# Patient Record
Sex: FEMALE | Race: OTHER | HISPANIC OR LATINO | Employment: PART TIME | URBAN - NONMETROPOLITAN AREA
[De-identification: names, ages, dates, MRNs, and addresses within clinical notes are randomized per-mention and may not be internally consistent; named-entity substitution may affect disease eponyms.]

---

## 2021-09-20 ENCOUNTER — HOSPITAL ENCOUNTER (EMERGENCY)
Facility: HOSPITAL | Age: 36
Discharge: HOME | End: 2021-09-20
Attending: EMERGENCY MEDICINE | Admitting: EMERGENCY MEDICINE
Payer: COMMERCIAL

## 2021-09-20 VITALS
DIASTOLIC BLOOD PRESSURE: 88 MMHG | OXYGEN SATURATION: 97 % | RESPIRATION RATE: 18 BRPM | TEMPERATURE: 99.4 F | HEIGHT: 65 IN | HEART RATE: 78 BPM | WEIGHT: 250 LBS | BODY MASS INDEX: 41.65 KG/M2 | SYSTOLIC BLOOD PRESSURE: 152 MMHG

## 2021-09-20 DIAGNOSIS — J02.9 SORE THROAT: ICD-10-CM

## 2021-09-20 DIAGNOSIS — R51.9 HEADACHE: Primary | ICD-10-CM

## 2021-09-20 LAB — SARS-COV-2 RNA RESP QL NAA+PROBE: NEGATIVE

## 2021-09-20 PROCEDURE — 96372 THER/PROPH/DIAG INJ SC/IM: CPT

## 2021-09-20 PROCEDURE — U0005 INFEC AGEN DETEC AMPLI PROBE: HCPCS | Performed by: EMERGENCY MEDICINE

## 2021-09-20 PROCEDURE — 99284 EMERGENCY DEPT VISIT MOD MDM: CPT | Performed by: EMERGENCY MEDICINE

## 2021-09-20 PROCEDURE — U0003 INFECTIOUS AGENT DETECTION BY NUCLEIC ACID (DNA OR RNA); SEVERE ACUTE RESPIRATORY SYNDROME CORONAVIRUS 2 (SARS-COV-2) (CORONAVIRUS DISEASE [COVID-19]), AMPLIFIED PROBE TECHNIQUE, MAKING USE OF HIGH THROUGHPUT TECHNOLOGIES AS DESCRIBED BY CMS-2020-01-R: HCPCS | Performed by: EMERGENCY MEDICINE

## 2021-09-20 PROCEDURE — 99283 EMERGENCY DEPT VISIT LOW MDM: CPT

## 2021-09-20 RX ORDER — ACETAMINOPHEN 325 MG/1
975 TABLET ORAL ONCE
Status: COMPLETED | OUTPATIENT
Start: 2021-09-20 | End: 2021-09-20

## 2021-09-20 RX ORDER — KETOROLAC TROMETHAMINE 30 MG/ML
15 INJECTION, SOLUTION INTRAMUSCULAR; INTRAVENOUS ONCE
Status: COMPLETED | OUTPATIENT
Start: 2021-09-20 | End: 2021-09-20

## 2021-09-20 RX ADMIN — KETOROLAC TROMETHAMINE 15 MG: 30 INJECTION, SOLUTION INTRAMUSCULAR; INTRAVENOUS at 22:14

## 2021-09-20 RX ADMIN — ACETAMINOPHEN 975 MG: 325 TABLET, FILM COATED ORAL at 22:14

## 2021-09-20 NOTE — Clinical Note
Eleanor Olmos was seen and treated in our emergency department on 9/20/2021  Other - See Comments        Diagnosis: headache, sore throat    Solange Sharpe  may return to work on return date  She may return on this date: 09/21/2021         If you have any questions or concerns, please don't hesitate to call        Michael Gross, DO    ______________________________           _______________          _______________  Hospital Representative                              Date                                Time

## 2021-09-21 NOTE — ED PROVIDER NOTES
History  Chief Complaint   Patient presents with    Headache     pt reports headache and sore throat for a few days now  Patient is a 59-year-old female with no significant medical history presenting for evaluation of a headache and sore throat  Patient says the symptoms have been present for the past 4 days  She says that the headache was gradual in onset and has been persistent  She says it is located at the top of her head  She denies associated nausea, vomiting, visual changes, neck pain  She says that she has felt warm and says that she had a temperature of 100 yesterday  She has last took ibuprofen this morning  She has been able to tolerate p o  She denies any cough, shortness of breath, chest pain  Patient says that 2 of her co-workers did recently test positive for COVID  She was vaccinated against COVID-19 with her last vaccine being in June  None       No past medical history on file  No past surgical history on file  No family history on file  I have reviewed and agree with the history as documented  E-Cigarette/Vaping     E-Cigarette/Vaping Substances     Social History     Tobacco Use    Smoking status: Never Smoker    Smokeless tobacco: Never Used   Substance Use Topics    Alcohol use: Never    Drug use: Not Currently       Review of Systems   Constitutional: Negative for fever and unexpected weight change  HENT: Positive for sore throat  Negative for congestion, ear pain and trouble swallowing  Eyes: Negative for pain and redness  Respiratory: Negative for cough, chest tightness and shortness of breath  Cardiovascular: Negative for chest pain and leg swelling  Gastrointestinal: Negative for abdominal distention, abdominal pain, diarrhea and vomiting  Endocrine: Negative for polyuria  Genitourinary: Negative for dysuria, hematuria, pelvic pain and vaginal bleeding  Musculoskeletal: Negative for back pain and myalgias     Skin: Negative for rash    Neurological: Positive for headaches  Negative for dizziness, syncope, weakness and light-headedness  Physical Exam  Physical Exam  Vitals and nursing note reviewed  Constitutional:       General: She is not in acute distress  Appearance: She is well-developed  HENT:      Head: Normocephalic and atraumatic  Right Ear: External ear normal       Left Ear: External ear normal       Nose: Nose normal       Mouth/Throat:      Pharynx: No oropharyngeal exudate  Eyes:      Conjunctiva/sclera: Conjunctivae normal       Pupils: Pupils are equal, round, and reactive to light  Cardiovascular:      Rate and Rhythm: Normal rate and regular rhythm  Heart sounds: Normal heart sounds  No murmur heard  No friction rub  No gallop  Pulmonary:      Effort: Pulmonary effort is normal  No respiratory distress  Breath sounds: Normal breath sounds  No wheezing or rales  Abdominal:      General: There is no distension  Palpations: Abdomen is soft  Tenderness: There is no abdominal tenderness  There is no guarding  Musculoskeletal:         General: No swelling, tenderness or deformity  Normal range of motion  Cervical back: Normal range of motion and neck supple  Lymphadenopathy:      Cervical: No cervical adenopathy  Skin:     General: Skin is warm and dry  Neurological:      Mental Status: She is alert and oriented to person, place, and time  Cranial Nerves: No cranial nerve deficit  Sensory: No sensory deficit  Motor: No abnormal muscle tone           Vital Signs  ED Triage Vitals   Temperature Pulse Respirations Blood Pressure SpO2   09/20/21 1945 09/20/21 1945 09/20/21 1945 09/20/21 1945 09/20/21 1945   99 4 °F (37 4 °C) 86 18 169/95 100 %      Temp Source Heart Rate Source Patient Position - Orthostatic VS BP Location FiO2 (%)   09/20/21 1945 -- 09/20/21 1945 09/20/21 1945 --   Tympanic  Sitting Right arm       Pain Score       09/20/21 2214       7 Vitals:    09/20/21 1945 09/20/21 2200   BP: 169/95 152/88   Pulse: 86 78   Patient Position - Orthostatic VS: Sitting          Visual Acuity      ED Medications  Medications   ketorolac (TORADOL) injection 15 mg (15 mg Intramuscular Given 9/20/21 2214)   acetaminophen (TYLENOL) tablet 975 mg (975 mg Oral Given 9/20/21 2214)       Diagnostic Studies  Results Reviewed     Procedure Component Value Units Date/Time    Novel Coronavirus (Covid-19),PCR [facility] - 2 Hour Stat [[id number]]  (Normal) Collected: 09/20/21 2213    Lab Status: Final result Specimen: Nares from Nose Updated: 09/20/21 2315     SARS-CoV-2 Negative    Narrative: The specimen collection materials, transport medium, and/or testing methodology utilized in the production of these test results have been proven to be reliable in a limited validation with an abbreviated program under the Emergency Utilization Authorization provided by the FDA  Testing reported as "Presumptive positive" will be confirmed with secondary testing to ensure result accuracy  Clinical caution and judgement should be used with the interpretation of these results with consideration of the clinical impression and other laboratory testing  Testing reported as "Positive" or "Negative" has been proven to be accurate according to standard laboratory validation requirements  All testing is performed with control materials showing appropriate reactivity at standard intervals  No orders to display              Procedures  Procedures         ED Course                                           MDM  Number of Diagnoses or Management Options  Diagnosis management comments: 78-year-old female presenting for 4 days of generalized headache, sore throat  Has been able to tolerate p o  No nausea, vomiting, visual changes  Headache gradual in nature  No cough, shortness of breath  Was vaccinated against [id number]-87 but has been around COVID positive coworkers  Vitals within normal limits, patient no acute distress, no tonsillar swelling or exudates on exam   Will screen for COVID  Will give Tylenol and Toradol for headache  Disposition  Final diagnoses:   Headache   Sore throat     Time reflects when diagnosis was documented in both MDM as applicable and the Disposition within this note     Time User Action Codes Description Comment    9/20/2021 11:15 PM Bren Fernandez [R51 9] Headache     9/20/2021 11:15 PM Angela Valdez [J02 9] Sore throat       ED Disposition     ED Disposition Condition Date/Time Comment    Discharge Stable Mon Sep 20, 2021 11:15 PM Stephenie Garcia discharge to home/self care  Follow-up Information     Follow up With Specialties Details Why [address] [facility]  Family Medicine Schedule an appointment as soon as possible for a visit  For follow up [place] [address] [phone]            There are no discharge medications for this patient  No discharge procedures on file      PDMP Review     None          ED Provider  Electronically Signed by           Marcy Kim DO  09/21/21 9972

## 2023-02-03 ENCOUNTER — HOSPITAL ENCOUNTER (EMERGENCY)
Facility: HOSPITAL | Age: 38
Discharge: HOME | End: 2023-02-04
Attending: EMERGENCY MEDICINE

## 2023-02-03 ENCOUNTER — APPOINTMENT (EMERGENCY)
Dept: RADIOLOGY | Facility: HOSPITAL | Age: 38
End: 2023-02-03

## 2023-02-03 VITALS
SYSTOLIC BLOOD PRESSURE: 140 MMHG | DIASTOLIC BLOOD PRESSURE: 84 MMHG | TEMPERATURE: 98 F | RESPIRATION RATE: 18 BRPM | HEART RATE: 87 BPM | OXYGEN SATURATION: 98 %

## 2023-02-03 DIAGNOSIS — M25.561 ACUTE PAIN OF RIGHT KNEE: Primary | ICD-10-CM

## 2023-02-03 RX ORDER — DICYCLOMINE HCL 20 MG
TABLET ORAL
COMMUNITY
Start: 2023-01-17

## 2023-02-04 RX ORDER — IBUPROFEN 600 MG/1
600 TABLET ORAL EVERY 6 HOURS PRN
Qty: 30 TABLET | Refills: 0 | Status: SHIPPED | OUTPATIENT
Start: 2023-02-04

## 2023-02-04 RX ORDER — SENNOSIDES 8.6 MG
650 CAPSULE ORAL EVERY 8 HOURS PRN
Qty: 30 TABLET | Refills: 0 | Status: SHIPPED | OUTPATIENT
Start: 2023-02-04

## 2023-02-04 NOTE — DISCHARGE INSTRUCTIONS
You have been seen for right knee pain  Please take tylenol and motrin as needed for pain as discussed  Return to the emergency department if you develop worsening pain, swelling, fevers, weakness/numbness or any other symptoms of concern  Please follow up with orthopedics by calling the number provided

## 2023-02-04 NOTE — ED PROVIDER NOTES
History  Chief Complaint   Patient presents with   • Knee Pain     Right knee pain  Unable to bear weight  Started 3 days ago     Rehana Moise is a 40y o  year old female with PMH of IBS presenting to the [place] [address] ED for right knee pain  Patient has had three days of constant pain  No reported falls/trauma to the area  Patient reporting 6/10 throbbing sensation  Pain is worse when bearing weight on the right leg  No pain in the right calf  Contrary to triage patient states she can bear weight on the right leg though this worsens her pain  No reported fevers/chills or swelling/redness in the right knee  Patient denies associated weakness/numbness in the RLE  The patient has not taken/received any medications at home for relief of symptoms  No history of diabetes or immunosuppression  Only daily medication is PRN bentyl  History provided by:  Patient and medical records   used: No    Knee Pain  Associated symptoms: back pain (chronic)    Associated symptoms: no fever        Prior to Admission Medications   Prescriptions Last Dose Informant Patient Reported? Taking?   dicyclomine (BENTYL) 20 mg tablet   Yes Yes   Sig: take 1 tablet by mouth every 6 hours for abdominal pain      Facility-Administered Medications: None       History reviewed  No pertinent past medical history  History reviewed  No pertinent surgical history  History reviewed  No pertinent family history  I have reviewed and agree with the history as documented  E-Cigarette/Vaping     E-Cigarette/Vaping Substances     Social History     Tobacco Use   • Smoking status: Never   • Smokeless tobacco: Never   Substance Use Topics   • Alcohol use: Never   • Drug use: Not Currently       Review of Systems   Constitutional: Negative for chills and fever  Respiratory: Negative for shortness of breath  Cardiovascular: Negative for chest pain  Gastrointestinal: Negative for abdominal pain     Musculoskeletal: Positive for arthralgias and back pain (chronic)  Negative for joint swelling and myalgias  Skin: Negative for color change and rash  Neurological: Negative for weakness and numbness  All other systems reviewed and are negative  Physical Exam  Physical Exam  Vitals and nursing note reviewed  Constitutional:       General: She is not in acute distress  Appearance: Normal appearance  She is well-developed  She is obese  She is not ill-appearing, toxic-appearing or diaphoretic  HENT:      Head: Normocephalic and atraumatic  Nose: No congestion or rhinorrhea  Eyes:      General:         Right eye: No discharge  Left eye: No discharge  Cardiovascular:      Rate and Rhythm: Normal rate and regular rhythm  Pulmonary:      Effort: Pulmonary effort is normal  No accessory muscle usage or respiratory distress  Breath sounds: Normal breath sounds  No stridor  No decreased breath sounds, wheezing, rhonchi or rales  Abdominal:      General: There is no distension  Palpations: Abdomen is soft  Tenderness: There is no abdominal tenderness  There is no guarding or rebound  Musculoskeletal:      Cervical back: Normal range of motion  No rigidity  Right hip: No tenderness  Normal range of motion  Normal strength  Right upper leg: No swelling, edema or tenderness  Right knee: Bony tenderness (infrapatellar and tibial tuberosity region) present  No swelling, effusion, erythema, ecchymosis, lacerations or crepitus  Normal range of motion  Tenderness present  No medial joint line or lateral joint line tenderness  Right lower leg: No tenderness  No edema  Left lower leg: No tenderness  No edema  Right ankle: No swelling  No tenderness  Normal range of motion  Right foot: No swelling or tenderness  Skin:     Capillary Refill: Capillary refill takes less than 2 seconds  Findings: No erythema or rash     Neurological:      Mental Status: She is alert and oriented to person, place, and time  Psychiatric:         Mood and Affect: Mood normal          Behavior: Behavior normal          Vital Signs  ED Triage Vitals [02/03/23 2304]   Temperature Pulse Respirations Blood Pressure SpO2   98 °F (36 7 °C) 87 18 140/84 98 %      Temp src Heart Rate Source Patient Position - Orthostatic VS BP Location FiO2 (%)   -- Monitor Lying Right arm --      Pain Score       6           Vitals:    02/03/23 2304   BP: 140/84   Pulse: 87   Patient Position - Orthostatic VS: Lying         Visual Acuity      ED Medications  Medications - No data to display    Diagnostic Studies  Results Reviewed     None                 XR knee 4+ vw right injury   ED Interpretation by Arpita Bowling DO (02/03 2342)   Mild effusion  Posterior knee osseous structure  No acute fracture or dislocation  Procedures  Procedures         ED Course                                             Medical Decision Making    40 y o  female presenting for atraumatic right knee pain  VSS, nontoxic appearing  RLE NVI  The patient does not report fevers/chills and there is no effusion/erythema  micromotion tenderness in the right knee, therefore I do not suspect septic arthritis  The patient does not demonstrate right calf tenderness, therefore I do not suspect DVT  DDx includes arthritis vs bursitis vs soft tissue injury of the knee  Will order xray to evaluate for pathologic fracture or arthritis  Patient declines analgesia in ED  I have reviewed preliminary ED interpretation of x-rays with the patient  The patient understands that their x-rays will be interpreted independently by a radiologist at a later time  The patient is agreeable to discharge at this time and understands that they may be called back to the emergency department pending formal xray interpretation by radiology       Disposition: I have discussed with the patient our plan to discharge them from the ED and the patient is in agreement with this plan  Discharge Plan: RICE therapy, PRN tylenol/motrin, crutches to facilitate right knee rest  RTED precautions emphasized  The patient was provided verbal RTED precautions and a written after visit summary with strict RTED precautions  Followup: I have discussed with the patient plan to follow up with Orthopedics  Contact information provided in AVS     Acute pain of right knee: acute illness or injury  Amount and/or Complexity of Data Reviewed  Radiology: ordered and independent interpretation performed  Risk  OTC drugs  Prescription drug management  Disposition  Final diagnoses:   Acute pain of right knee     Time reflects when diagnosis was documented in both MDM as applicable and the Disposition within this note     Time User Action Codes Description Comment    2/3/2023 11:48 PM Kristopher Harmon Add [[id number] [id number]] Acute pain of right knee       ED Disposition     ED Disposition   Discharge    Condition   Stable    Date/Time   Sat Feb 4, 2023 12:01 AM    Comment   Stephenie Garcia discharge to home/self care  Follow-up Information     Follow up With Specialties Details Why Contact Info Additional [address] [facility]ists [facility] Orthopedic Surgery Schedule an appointment as soon as possible for a visit  To make appointment for reevaluation in 3-5 days   [address] [facility] [id number]  [address] [place] [address] Specialists [facility] [address] [facility], [place], [place], [address]          Patient's Medications   Discharge Prescriptions    ACETAMINOPHEN (TYLENOL) 650 MG CR TABLET    Take 1 tablet (650 mg total) by mouth every 8 (eight) hours as needed for moderate pain       Start Date: 2/4/2023  End Date: --       Order Dose: 650 mg       Quantity: 30 tablet    Refills: 0    IBUPROFEN (MOTRIN) 600 MG TABLET    Take 1 tablet (600 mg total) by mouth every 6 (six) hours as needed for moderate pain or mild pain       Start Date: 2/4/2023  End Date: --       Order Dose: 600 mg       Quantity: 30 tablet    Refills: 0       No discharge procedures on file      PDMP Review     None          ED Provider  Electronically Signed by           Elan Harmon DO  02/04/23 0013

## 2023-03-09 ENCOUNTER — HOSPITAL ENCOUNTER (EMERGENCY)
Facility: HOSPITAL | Age: 38
Discharge: HOME | End: 2023-03-10
Attending: EMERGENCY MEDICINE

## 2023-03-09 DIAGNOSIS — N89.8 VAGINAL DISCHARGE: ICD-10-CM

## 2023-03-09 DIAGNOSIS — R10.2 SUPRAPUBIC DISCOMFORT: Primary | ICD-10-CM

## 2023-03-10 VITALS
SYSTOLIC BLOOD PRESSURE: 166 MMHG | DIASTOLIC BLOOD PRESSURE: 81 MMHG | TEMPERATURE: 97.6 F | OXYGEN SATURATION: 98 % | RESPIRATION RATE: 18 BRPM | HEART RATE: 78 BPM

## 2023-03-10 LAB
BILIRUB UR QL STRIP: NEGATIVE
C TRACH DNA SPEC QL NAA+PROBE: NEGATIVE
CLARITY UR: CLEAR
COLOR UR: YELLOW
EXT PREGNANCY TEST URINE: NEGATIVE
EXT. CONTROL: NORMAL
GLUCOSE UR STRIP-MCNC: NEGATIVE MG/DL
HGB UR QL STRIP.AUTO: NEGATIVE
KETONES UR STRIP-MCNC: NEGATIVE MG/DL
LEUKOCYTE ESTERASE UR QL STRIP: NEGATIVE
N GONORRHOEA DNA SPEC QL NAA+PROBE: NEGATIVE
NITRITE UR QL STRIP: NEGATIVE
PH UR STRIP.AUTO: 6 [PH]
PROT UR STRIP-MCNC: NEGATIVE MG/DL
SP GR UR STRIP.AUTO: >=1.03 (ref 1–1.03)
UROBILINOGEN UR QL STRIP.AUTO: 0.2 E.U./DL

## 2023-03-10 RX ORDER — IBUPROFEN 600 MG/1
600 TABLET ORAL ONCE
Status: COMPLETED | OUTPATIENT
Start: 2023-03-10 | End: 2023-03-10

## 2023-03-10 RX ADMIN — IBUPROFEN 600 MG: 600 TABLET, FILM COATED ORAL at 00:17

## 2023-03-10 NOTE — ED PROVIDER NOTES
History  Chief Complaint   Patient presents with   • Painful Urination     Pt treated for UTI outpt 5 days of macrobrid  Still having urinary pain bladder pressure/cramping     Bartolo Andrade is a 40y o  year old female with PMH of IBS, hepatic steatosis presenting to the [facility] ED for suprapubic pain and urethral discomfort  Patient has had one week of suprapubic abdominal pain  Patient was diagnosed with a urinary tract infection and started on course of Macrobid  Patient initially had resolution of symptoms though today reports return of urinary urgency and suprapubic discomfort  Suprapubic pain is intermittent, rated as 6/10 at worst and intermittently radiating to bilateral lower quadrants of the abdomen  Patient reports frequency though denies dysuria/hematuria  No flank pain  Denies fevers, nausea/vomiting or any other abdominal pain  Patient states she is due for her period in 1 week  She reports a thin, white vaginal discharge and of no vaginal bleeding  She is in a monogamous relationship, she has no concerns for STD  The patient has not taken/received any medications at home for relief of symptoms  History provided by:  Patient and medical records   used: No        Prior to Admission Medications   Prescriptions Last Dose Informant Patient Reported? Taking?   acetaminophen (TYLENOL) 650 mg CR tablet   No No   Sig: Take 1 tablet (650 mg total) by mouth every 8 (eight) hours as needed for moderate pain   dicyclomine (BENTYL) 20 mg tablet   Yes No   Sig: take 1 tablet by mouth every 6 hours for abdominal pain   ibuprofen (MOTRIN) 600 mg tablet   No No   Sig: Take 1 tablet (600 mg total) by mouth every 6 (six) hours as needed for moderate pain or mild pain      Facility-Administered Medications: None       Past Medical History:   Diagnosis Date   • IBS (irritable bowel syndrome)        History reviewed  No pertinent surgical history  History reviewed   No pertinent family history  I have reviewed and agree with the history as documented  E-Cigarette/Vaping     E-Cigarette/Vaping Substances     Social History     Tobacco Use   • Smoking status: Never   • Smokeless tobacco: Never   Substance Use Topics   • Alcohol use: Never   • Drug use: Not Currently       Review of Systems   Constitutional: Negative for chills and fever  Respiratory: Negative for shortness of breath  Cardiovascular: Negative for chest pain  Gastrointestinal: Positive for abdominal pain  Negative for diarrhea, nausea and vomiting  Genitourinary: Positive for frequency, urgency and vaginal discharge  Negative for dysuria, flank pain and vaginal bleeding  Musculoskeletal: Negative for arthralgias  All other systems reviewed and are negative  Physical Exam  Physical Exam  Vitals and nursing note reviewed  Constitutional:       General: She is not in acute distress  Appearance: Normal appearance  She is well-developed  She is not ill-appearing, toxic-appearing or diaphoretic  HENT:      Head: Normocephalic and atraumatic  Nose: No congestion or rhinorrhea  Eyes:      General:         Right eye: No discharge  Left eye: No discharge  Cardiovascular:      Rate and Rhythm: Normal rate and regular rhythm  Pulmonary:      Effort: Pulmonary effort is normal  No accessory muscle usage or respiratory distress  Breath sounds: Normal breath sounds  No stridor  No decreased breath sounds, wheezing, rhonchi or rales  Abdominal:      General: There is no distension  Palpations: Abdomen is soft  Tenderness: There is abdominal tenderness in the suprapubic area  There is no right CVA tenderness, left CVA tenderness, guarding or rebound  Musculoskeletal:      Cervical back: Normal range of motion  No rigidity  Right lower leg: No tenderness  Left lower leg: No tenderness  Skin:     Capillary Refill: Capillary refill takes less than 2 seconds  Neurological:      Mental Status: She is alert and oriented to person, place, and time  Psychiatric:         Mood and Affect: Mood normal          Behavior: Behavior normal          Vital Signs  ED Triage Vitals [03/09/23 2345]   Temperature Pulse Respirations Blood Pressure SpO2   97 6 °F (36 4 °C) 89 17 162/95 100 %      Temp src Heart Rate Source Patient Position - Orthostatic VS BP Location FiO2 (%)   -- Monitor Lying Left arm --      Pain Score       --           Vitals:    03/09/23 2345 03/10/23 0030 03/10/23 0128   BP: 162/95 166/92 166/81   Pulse: 89 75 78   Patient Position - Orthostatic VS: Lying Sitting Sitting         Visual Acuity      ED Medications  Medications   ibuprofen (MOTRIN) tablet 600 mg (600 mg Oral Given 3/10/23 0017)       Diagnostic Studies  Results Reviewed     Procedure Component Value Units Date/Time    Urine culture [[id number]]     Lab Status: No result Specimen: Urine, Clean Catch     UA w Reflex to Microscopic w Reflex to Culture [[id number]] Collected: 03/10/23 0011    Lab Status: Final result Specimen: Urine, Clean Catch Updated: 03/10/23 0031     Color, UA Yellow     Clarity, UA Clear     Specific Gravity, UA >=1 030     pH, UA 6 0     Leukocytes, UA Negative     Nitrite, UA Negative     Protein, UA Negative mg/dl      Glucose, UA Negative mg/dl      Ketones, UA Negative mg/dl      Urobilinogen, UA 0 2 E U /dl      Bilirubin, UA Negative     Occult Blood, UA Negative    Chlamydia/GC amplified DNA by PCR [[id number]] Collected: 03/10/23 0011    Lab Status: In process Updated: 03/10/23 0023    POCT pregnancy, urine [[id number]]  (Normal) Resulted: 03/10/23 0017    Lab Status: Final result Updated: 03/10/23 0017     EXT Preg Test, Ur Negative     Control Valid                 No orders to display              Procedures  Procedures         ED Course                                             Medical Decision Making    40 y o  female presenting for suprapubic pain    Afebrile, nontoxic appearing  Minimal suprapubic pain on exam   I considered and do not suspect appendicitis, diverticulitis, aortic aneurysm, retroperitoneal hematoma/hemorrhage, intraabdominal perforation/abscess, bladder rupture, kidney stone or ovarian torsion  We will order repeat UA to exclude UTI refractory to previous course of Macrobid  Lower suspicion for STD though will add urine G/C  Differential includes interstitial cystitis or perimenstrual cramping  Reassessment: Patient remains well-appearing  Reviewed UA results  As patient without abnormal UA and no concern for STD on history, will observe off antibiotics and await urine culture results  Patient agreeable to defer labs or imaging at this time  Disposition: I have discussed with the patient our plan to discharge them from the ED and the patient is in agreement with this plan  Discharge Plan: Parent Tylenol/Motrin over-the-counter  PCP f/u  RTED precautions emphasized  The patient was provided a written after visit summary with strict RTED precautions  Followup: I have discussed with the patient plan to follow up with their PCP  Contact information provided in AVS     Amount and/or Complexity of Data Reviewed  Labs: ordered  Risk  Prescription drug management  Disposition  Final diagnoses:   Suprapubic discomfort   Vaginal discharge     Time reflects when diagnosis was documented in both MDM as applicable and the Disposition within this note     Time User Action Codes Description Comment    3/10/2023  1:21 AM Kannan Done Add [R10 2] Suprapubic discomfort     3/10/2023  1:21 AM Kannan Done Add [N89 8] Vaginal discharge       ED Disposition     ED Disposition   Discharge    Condition   Stable    Date/Time   Fri Mar 10, 2023 12:56 AM    Comment   Stephenie Garcia discharge to home/self care                 Follow-up Information     Follow up With Specialties Details Why [address] [facility], DO Family Medicine Schedule an appointment as soon as possible for a visit  To make appointment for reevaluation in 3-5 days  [address]  [phone]            Discharge Medication List as of 3/10/2023  1:22 AM      CONTINUE these medications which have NOT CHANGED    Details   acetaminophen (TYLENOL) 650 mg CR tablet Take 1 tablet (650 mg total) by mouth every 8 (eight) hours as needed for moderate pain, Starting Sat 2/4/2023, Normal      dicyclomine (BENTYL) 20 mg tablet take 1 tablet by mouth every 6 hours for abdominal pain, Historical Med      ibuprofen (MOTRIN) 600 mg tablet Take 1 tablet (600 mg total) by mouth every 6 (six) hours as needed for moderate pain or mild pain, Starting Sat 2/4/2023, Normal             No discharge procedures on file      PDMP Review     None          ED Provider  Electronically Signed by           Karissa Paz DO  03/10/23 0148

## 2023-03-10 NOTE — DISCHARGE INSTRUCTIONS
You have been seen for suprapubic discomfort  Please take tylenol and motrin as needed for your symptoms  Return to the emergency department if you develop worsening pain, vomiting, fevers or any other symptoms of concern  Please follow up with your PCP by calling the number provided  Your blood pressure is elevated on your visit today  When left untreated, the damage that high blood pressure does to your circulatory system is a significant contributing factor to heart attack, stroke, chronic kidney disease and other health threats  Please arrange for a blood pressure recheck with a PCP within the next week for further evaluation

## 2023-03-11 LAB — BACTERIA UR CULT: NORMAL

## 2023-03-31 ENCOUNTER — HOSPITAL ENCOUNTER (EMERGENCY)
Facility: HOSPITAL | Age: 38
Discharge: HOME | End: 2023-04-01
Attending: EMERGENCY MEDICINE

## 2023-03-31 VITALS
HEART RATE: 62 BPM | TEMPERATURE: 98.1 F | DIASTOLIC BLOOD PRESSURE: 95 MMHG | RESPIRATION RATE: 18 BRPM | OXYGEN SATURATION: 99 % | SYSTOLIC BLOOD PRESSURE: 179 MMHG

## 2023-03-31 DIAGNOSIS — N76.0 VAGINITIS: Primary | ICD-10-CM

## 2023-04-01 LAB
BACTERIA UR QL AUTO: ABNORMAL /HPF
BILIRUB UR QL STRIP: NEGATIVE
CLARITY UR: CLEAR
COLOR UR: YELLOW
GLUCOSE UR STRIP-MCNC: NEGATIVE MG/DL
HGB UR QL STRIP.AUTO: NEGATIVE
KETONES UR STRIP-MCNC: NEGATIVE MG/DL
LEUKOCYTE ESTERASE UR QL STRIP: ABNORMAL
MUCOUS THREADS UR QL AUTO: ABNORMAL
NITRITE UR QL STRIP: NEGATIVE
NON-SQ EPI CELLS URNS QL MICRO: ABNORMAL /HPF
PH UR STRIP.AUTO: 6 [PH]
PROT UR STRIP-MCNC: NEGATIVE MG/DL
RBC #/AREA URNS AUTO: ABNORMAL /HPF
SP GR UR STRIP.AUTO: 1.02 (ref 1–1.03)
UROBILINOGEN UR QL STRIP.AUTO: 0.2 E.U./DL
WBC #/AREA URNS AUTO: ABNORMAL /HPF

## 2023-04-01 RX ORDER — METRONIDAZOLE 500 MG/1
500 TABLET ORAL 2 TIMES DAILY
Qty: 14 TABLET | Refills: 0 | Status: SHIPPED | OUTPATIENT
Start: 2023-04-01 | End: 2023-04-08

## 2023-04-01 RX ORDER — METRONIDAZOLE 500 MG/1
500 TABLET ORAL ONCE
Status: DISCONTINUED | OUTPATIENT
Start: 2023-04-01 | End: 2023-04-01 | Stop reason: HOSPADM

## 2023-04-01 NOTE — ED PROVIDER NOTES
History  Chief Complaint   Patient presents with   • Possible UTI     Here for UTI on 3/9 finished ABX and had STD testing  States after medication finsihed had a followup  UA and showed UTI resolved  Now states some vaginal discharged  Not sure if the previous ABX provoked it     59-year-old female  with recent diagnosis for UTI in the beginning of March, who presents now for vaginal complaints  Patient states that her symptoms were partially resolved after antibiotics several weeks ago, although she did have some continued symptoms  Today she is describing occasional itching in the area, no dysuria, she does describe some pain with intercourse, as well as occasional greenish discharge  Patient had negative STD testing in beginning of the month  Denies fevers or chills, no abdominal pain, no back pain  Normal period approximately 2 weeks ago  Review of systems otherwise negative  Prior to Admission Medications   Prescriptions Last Dose Informant Patient Reported? Taking?   acetaminophen (TYLENOL) 650 mg CR tablet   No No   Sig: Take 1 tablet (650 mg total) by mouth every 8 (eight) hours as needed for moderate pain   dicyclomine (BENTYL) 20 mg tablet   Yes No   Sig: take 1 tablet by mouth every 6 hours for abdominal pain   ibuprofen (MOTRIN) 600 mg tablet   No No   Sig: Take 1 tablet (600 mg total) by mouth every 6 (six) hours as needed for moderate pain or mild pain      Facility-Administered Medications: None       Past Medical History:   Diagnosis Date   • IBS (irritable bowel syndrome)        No past surgical history on file  No family history on file  I have reviewed and agree with the history as documented      E-Cigarette/Vaping     E-Cigarette/Vaping Substances     Social History     Tobacco Use   • Smoking status: Never   • Smokeless tobacco: Never   Substance Use Topics   • Alcohol use: Never   • Drug use: Not Currently       Review of Systems   Constitutional: Negative for chills and fever    HENT: Negative for congestion, rhinorrhea and sore throat  Respiratory: Negative for cough and shortness of breath  Cardiovascular: Negative for chest pain and palpitations  Gastrointestinal: Negative for abdominal pain, constipation, diarrhea, nausea and vomiting  Genitourinary: Positive for vaginal discharge and vaginal pain  Negative for difficulty urinating and flank pain  Musculoskeletal: Negative for arthralgias  Neurological: Negative for dizziness, weakness, light-headedness and headaches  Psychiatric/Behavioral: Negative for agitation, behavioral problems and confusion  All other systems reviewed and are negative  Physical Exam  Physical Exam  Constitutional:       Appearance: She is well-developed  HENT:      Head: Normocephalic and atraumatic  Cardiovascular:      Rate and Rhythm: Normal rate and regular rhythm  Heart sounds: Normal heart sounds  No murmur heard  No friction rub  Pulmonary:      Effort: Pulmonary effort is normal  No respiratory distress  Breath sounds: Normal breath sounds  No wheezing or rales  Abdominal:      General: Bowel sounds are normal  There is no distension  Palpations: Abdomen is soft  Tenderness: There is no abdominal tenderness  Genitourinary:     Vagina: Vaginal discharge present  Comments: Whitish discharge, no cervical abnormalities, no CMT  Musculoskeletal:         General: Normal range of motion  Cervical back: Normal range of motion and neck supple  Skin:     General: Skin is warm  Neurological:      Mental Status: She is alert and oriented to person, place, and time  Coordination: Coordination normal    Psychiatric:         Behavior: Behavior normal          Thought Content:  Thought content normal          Judgment: Judgment normal          Vital Signs  ED Triage Vitals [03/31/23 2312]   Temperature Pulse Respirations Blood Pressure SpO2   98 1 °F (36 7 °C) 62 18 (!) 179/95 99 % Temp Source Heart Rate Source Patient Position - Orthostatic VS BP Location FiO2 (%)   Temporal Monitor -- -- --      Pain Score       No Pain           Vitals:    03/31/23 2312   BP: (!) 179/95   Pulse: 62         Visual Acuity      ED Medications  Medications   metroNIDAZOLE (FLAGYL) tablet 500 mg (has no administration in time range)       Diagnostic Studies  Results Reviewed     Procedure Component Value Units Date/Time    Urine Microscopic [[id number]]  (Abnormal) Resulted: 04/01/23 0211    Lab Status: Final result Specimen: Urine Updated: 04/01/23 0211     RBC, UA None Seen /hpf      WBC, UA 1-2 /hpf      Epithelial Cells Occasional /hpf      Bacteria, UA Occasional /hpf      MUCUS THREADS Occasional    UA w Reflex to Microscopic w Reflex to Culture [[id number]]  (Abnormal) Resulted: 04/01/23 0155    Lab Status: Final result Specimen: Urine Updated: 04/01/23 0155     Color, UA Yellow     Clarity, UA Clear     Specific [place], UA 1 020     pH, UA 6 0     Leukocytes, UA Small     Nitrite, UA Negative     Protein, UA Negative mg/dl      Glucose, UA Negative mg/dl      Ketones, UA Negative mg/dl      Urobilinogen, UA 0 2 E U /dl      Bilirubin, UA Negative     Occult Blood, UA Negative    Trichomonas Vaginalis, NAKUL [[id number]] Updated: 04/01/23 0149    Lab Status: In process     Chlamydia/GC amplified DNA by PCR [[id number]] Updated: 04/01/23 0143    Lab Status: In process     VAGINOSIS DNA PROBE (AFFIRM) [[id number]] Updated: 04/01/23 0143    Lab Status: In process Specimen: Genital     POCT pregnancy, urine [[id number]]     Lab Status: No result                  No orders to display              Procedures  Procedures         ED Course                                             Medical Decision Making  I reviewed the patient's medical chart, PMHx, prior encounters, medications  Reviewed recent urine studies that demonstrated normal urinalysis and negative GC testing      My DDx includes: GC, trichomonas, UTI, BV    Patient does not demonstrate any evidence for PID at this time  She denies any new sexual partners  Given continued symptoms from prior when she had negative GC test, I do believe GC is unlikely but will retest for today  Will test for trichomonas, perform broad urinalysis  BV swab  Urinalysis was negative  Will empirically treat for BV given sx and findings on exam  Patient is agreeable to this  Advised to follow up results of pending tests  She is agreeable  Discharged with strict return precautions  Advised follow up with gynecology and provided information for this  Amount and/or Complexity of Data Reviewed  Labs: ordered  Risk  Prescription drug management  Disposition  Final diagnoses:   Vaginitis     Time reflects when diagnosis was documented in both MDM as applicable and the Disposition within this note     Time User Action Codes Description Comment    4/1/2023  1:25 AM Consuelo Roldan Add [N76 0] Vaginitis       ED Disposition     ED Disposition   Discharge    Condition   Stable    Date/Time   Sat Apr 1, 2023  1:25 AM    Comment   Stephenie Garcia discharge to home/self care                 Follow-up Information     Follow up With Specialties Details Why Contact Info Additional [address], DO Family Medicine Call  For re-evaluation [address] [facility] [phone]       [address] [facility]etrics and Gynecology Call  For re-evaluation [address] [phone]  [address] [place], [place], [address]          Discharge Medication List as of 4/1/2023  1:27 AM      START taking these medications    Details   metroNIDAZOLE (FLAGYL) 500 mg tablet Take 1 tablet (500 mg total) by mouth 2 (two) times a day for 7 days, Starting Sat 4/1/2023, Until Sat 4/8/2023, Normal         CONTINUE these medications which have NOT CHANGED    Details   acetaminophen (TYLENOL) 650 mg CR tablet Take 1 tablet (650 mg total) by mouth every 8 (eight) hours as needed for moderate pain, Starting Sat 2/4/2023, Normal      dicyclomine (BENTYL) 20 mg tablet take 1 tablet by mouth every 6 hours for abdominal pain, Historical Med      ibuprofen (MOTRIN) 600 mg tablet Take 1 tablet (600 mg total) by mouth every 6 (six) hours as needed for moderate pain or mild pain, Starting Sat 2/4/2023, Normal             No discharge procedures on file      PDMP Review     None          ED Provider  Electronically Signed by           Monika Love MD  04/01/23 4614

## 2023-04-01 NOTE — DISCHARGE INSTRUCTIONS
Please follow all return precautions  Do not drink alcohol while taking flagyl medication, can result in severe response  Please follow up on pending results with gynecologist     Thank you

## 2023-04-03 LAB
CANDIDA RRNA VAG QL PROBE: NEGATIVE
G VAGINALIS RRNA GENITAL QL PROBE: NEGATIVE
T VAGINALIS RRNA GENITAL QL PROBE: NEGATIVE

## 2023-04-04 LAB
C TRACH DNA SPEC QL NAA+PROBE: NEGATIVE
N GONORRHOEA DNA SPEC QL NAA+PROBE: NEGATIVE

## 2023-04-05 LAB — T VAGINALIS RRNA SPEC QL NAA+PROBE: NEGATIVE

## 2023-08-16 ENCOUNTER — HOSPITAL ENCOUNTER (EMERGENCY)
Facility: HOSPITAL | Age: 38
Discharge: HOME | End: 2023-08-17
Attending: EMERGENCY MEDICINE
Payer: COMMERCIAL

## 2023-08-16 VITALS
BODY MASS INDEX: 41.65 KG/M2 | RESPIRATION RATE: 18 BRPM | TEMPERATURE: 97.3 F | HEART RATE: 78 BPM | WEIGHT: 250 LBS | HEIGHT: 65 IN | SYSTOLIC BLOOD PRESSURE: 138 MMHG | DIASTOLIC BLOOD PRESSURE: 88 MMHG | OXYGEN SATURATION: 97 %

## 2023-08-16 DIAGNOSIS — R42 LIGHTHEADEDNESS: ICD-10-CM

## 2023-08-16 DIAGNOSIS — M25.472 BILATERAL SWELLING OF FEET AND ANKLES: Primary | ICD-10-CM

## 2023-08-16 DIAGNOSIS — M25.471 BILATERAL SWELLING OF FEET AND ANKLES: Primary | ICD-10-CM

## 2023-08-16 DIAGNOSIS — M25.475 BILATERAL SWELLING OF FEET AND ANKLES: Primary | ICD-10-CM

## 2023-08-16 DIAGNOSIS — M25.474 BILATERAL SWELLING OF FEET AND ANKLES: Primary | ICD-10-CM

## 2023-08-16 LAB
ANION GAP SERPL CALCULATED.3IONS-SCNC: 8 MMOL/L
BUN SERPL-MCNC: 10 MG/DL (ref 5–25)
CALCIUM SERPL-MCNC: 9.4 MG/DL (ref 8.4–10.2)
CHLORIDE SERPL-SCNC: 104 MMOL/L (ref 96–108)
CO2 SERPL-SCNC: 26 MMOL/L (ref 21–32)
CREAT SERPL-MCNC: 0.62 MG/DL (ref 0.6–1.3)
ERYTHROCYTE [DISTWIDTH] IN BLOOD BY AUTOMATED COUNT: 14.3 % (ref 11.6–15.1)
GFR SERPL CREATININE-BSD FRML MDRD: 114 ML/MIN/1.73SQ M
GLUCOSE SERPL-MCNC: 89 MG/DL (ref 65–140)
HCT VFR BLD AUTO: 35.4 % (ref 34.8–46.1)
HGB BLD-MCNC: 11.1 G/DL (ref 11.5–15.4)
MCH RBC QN AUTO: 28.8 PG (ref 26.8–34.3)
MCHC RBC AUTO-ENTMCNC: 31.4 G/DL (ref 31.4–37.4)
MCV RBC AUTO: 92 FL (ref 82–98)
PLATELET # BLD AUTO: 224 THOUSANDS/UL (ref 149–390)
PMV BLD AUTO: 10.1 FL (ref 8.9–12.7)
POTASSIUM SERPL-SCNC: 3.9 MMOL/L (ref 3.5–5.3)
RBC # BLD AUTO: 3.86 MILLION/UL (ref 3.81–5.12)
SODIUM SERPL-SCNC: 138 MMOL/L (ref 135–147)
WBC # BLD AUTO: 8.55 THOUSAND/UL (ref 4.31–10.16)

## 2023-08-16 PROCEDURE — 81025 URINE PREGNANCY TEST: CPT | Performed by: EMERGENCY MEDICINE

## 2023-08-16 PROCEDURE — 36415 COLL VENOUS BLD VENIPUNCTURE: CPT | Performed by: EMERGENCY MEDICINE

## 2023-08-16 PROCEDURE — 99284 EMERGENCY DEPT VISIT MOD MDM: CPT | Performed by: EMERGENCY MEDICINE

## 2023-08-16 PROCEDURE — NC001 PR NO CHARGE: Performed by: EMERGENCY MEDICINE

## 2023-08-16 PROCEDURE — 80048 BASIC METABOLIC PNL TOTAL CA: CPT | Performed by: EMERGENCY MEDICINE

## 2023-08-16 PROCEDURE — 99283 EMERGENCY DEPT VISIT LOW MDM: CPT

## 2023-08-16 PROCEDURE — 85027 COMPLETE CBC AUTOMATED: CPT | Performed by: EMERGENCY MEDICINE

## 2023-08-17 LAB
EXT PREGNANCY TEST URINE: NEGATIVE
EXT. CONTROL: NORMAL

## 2023-08-17 NOTE — ED PROVIDER NOTES
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 Patient states they took her bp at work and it was 150's/90's. Wally Hoang is a 45y.o. year old female with PMH of IBS presenting to the [facility] ED for bilateral ankle swelling and lightheadedness. Patient noticed swelling in bilateral ankles earlier today while at work. No pain in the calves or in the ankles. No trauma to the area. Patient had episode where she became lightheaded without associated chest pain or dyspnea. The patient checked her blood pressure which was elevated compared to her baseline which prompted her to present to the ER for evaluation. Patient denies associated headaches, visual changes, weakness/numbness in extremities. No chest pain or dyspnea. No nausea/vomiting/diarrhea or abdominal pain. Patient denies dysuria/hematuria or difficulties urinating. Patient denies personal or family history of DVT/PE. Denies unilateral leg pain/swelling. Not currently on estrogen-containing contraceptive medication. No recent travel. Patient does not take any prescribed medications daily. History provided by:  Medical records and patient   used: No        Prior to Admission Medications   Prescriptions Last Dose Informant Patient Reported?  Taking?   acetaminophen (TYLENOL) 650 mg CR tablet   No No   Sig: Take 1 tablet (650 mg total) by mouth every 8 (eight) hours as needed for moderate pain   dicyclomine (BENTYL) 20 mg tablet   Yes No   Sig: take 1 tablet by mouth every 6 hours for abdominal pain   ibuprofen (MOTRIN) 600 mg tablet   No No   Sig: Take 1 tablet (600 mg total) by mouth every 6 (six) hours as needed for moderate pain or mild pain      Facility-Administered Medications: None       Past Medical History:   Diagnosis Date   • IBS (irritable bowel syndrome)        History reviewed. No pertinent surgical history. History reviewed. No pertinent family history. I have reviewed and agree with the history as documented. E-Cigarette/Vaping   • E-Cigarette Use Never User      E-Cigarette/Vaping Substances     Social History     Tobacco Use   • Smoking status: Never   • Smokeless tobacco: Never   Vaping Use   • Vaping Use: Never used   Substance Use Topics   • Alcohol use: Never   • Drug use: Not Currently       Review of Systems   Constitutional: Negative for chills and fever. Respiratory: Negative for cough and shortness of breath. Cardiovascular: Negative for chest pain. Gastrointestinal: Negative for abdominal pain, diarrhea, nausea and vomiting. Genitourinary: Negative for dysuria, flank pain and hematuria. Musculoskeletal: Positive for joint swelling. Negative for arthralgias and myalgias. Skin: Negative for color change and rash. Neurological: Positive for light-headedness. Negative for weakness, numbness and headaches. All other systems reviewed and are negative. Physical Exam  Physical Exam  Vitals and nursing note reviewed. Constitutional:       General: She is not in acute distress. Appearance: Normal appearance. She is well-developed. She is not ill-appearing, toxic-appearing or diaphoretic. HENT:      Head: Normocephalic and atraumatic. Nose: No congestion or rhinorrhea. Eyes:      General:         Right eye: No discharge. Left eye: No discharge. Cardiovascular:      Rate and Rhythm: Normal rate and regular rhythm. Pulmonary:      Effort: Pulmonary effort is normal. No accessory muscle usage or respiratory distress. Breath sounds: Normal breath sounds. No stridor. No decreased breath sounds, wheezing, rhonchi or rales. Abdominal:      General: There is no distension. Palpations: Abdomen is soft. Tenderness: There is no abdominal tenderness.  There is no right CVA tenderness, left CVA tenderness, guarding or rebound. Musculoskeletal:      Cervical back: Normal range of motion and neck supple. No rigidity. Right lower leg: No tenderness. No edema. Left lower leg: No tenderness. No edema. Right ankle: Swelling (Trace) present. No ecchymosis. No tenderness. Normal range of motion. Left ankle: Swelling (Trace) present. No ecchymosis. No tenderness. Normal range of motion. Right foot: Normal range of motion. No swelling, tenderness or bony tenderness. Left foot: Normal range of motion. No swelling, tenderness or bony tenderness. Skin:     Capillary Refill: Capillary refill takes less than 2 seconds. Findings: No rash. Neurological:      Mental Status: She is alert and oriented to person, place, and time. GCS: GCS eye subscore is 4. GCS verbal subscore is 5. GCS motor subscore is 6. Cranial Nerves: No dysarthria or facial asymmetry.    Psychiatric:         Mood and Affect: Mood normal.         Behavior: Behavior normal.         Vital Signs  ED Triage Vitals [08/16/23 2231]   Temperature Pulse Respirations Blood Pressure SpO2   (!) 97.3 °F (36.3 °C) 78 18 138/88 97 %      Temp Source Heart Rate Source Patient Position - Orthostatic VS BP Location FiO2 (%)   Temporal Monitor Lying Left arm --      Pain Score       No Pain           Vitals:    08/16/23 2231   BP: 138/88   Pulse: 78   Patient Position - Orthostatic VS: Lying         Visual Acuity      ED Medications  Medications - No data to display    Diagnostic Studies  Results Reviewed     Procedure Component Value Units Date/Time    POCT pregnancy, urine [[id number]]  (Normal) Resulted: 08/17/23 0014    Lab Status: Final result Updated: 08/17/23 0014     EXT Preg Test, Ur Negative     Control Valid    Basic metabolic panel [[id number]] Collected: 08/16/23 2302    Lab Status: Final result Specimen: Blood from Arm, Left Updated: 08/16/23 2336     Sodium 138 mmol/L      Potassium 3.9 mmol/L      Chloride 104 mmol/L      CO2 26 mmol/L      ANION GAP 8 mmol/L      BUN 10 mg/dL      Creatinine 0.62 mg/dL      Glucose 89 mg/dL      Calcium 9.4 mg/dL      eGFR 114 ml/min/1.73sq m     Narrative:      [facility] guidelines for Chronic Kidney Disease (CKD):   •  Stage 1 with normal or high GFR (GFR > 90 mL/min/1.73 square meters)  •  Stage 2 Mild CKD (GFR = 60-89 mL/min/1.73 square meters)  •  Stage 3A Moderate CKD (GFR = 45-59 mL/min/1.73 square meters)  •  Stage 3B Moderate CKD (GFR = 30-44 mL/min/1.73 square meters)  •  Stage 4 Severe CKD (GFR = 15-29 mL/min/1.73 square meters)  •  Stage 5 End Stage CKD (GFR <15 mL/min/1.73 square meters)  Note: GFR calculation is accurate only with a steady state creatinine    CBC and Platelet [[id number]]  (Abnormal) Collected: 08/16/23 2302    Lab Status: Final result Specimen: Blood from Arm, Left Updated: 08/16/23 2309     WBC 8.55 Thousand/uL      RBC 3.86 Million/uL      Hemoglobin 11.1 g/dL      Hematocrit 35.4 %      MCV 92 fL      MCH 28.8 pg      MCHC 31.4 g/dL      RDW 14.3 %      Platelets 026 Thousands/uL      MPV 10.1 fL                  No orders to display              Procedures  Procedures         ED Course  ED Course as of 08/17/23 0110   Wed Aug 16, 2023   2300 Procedure Note: EKG  Date/Time: 08/16/23 11:00 PM   Interpreted by: Tamra Landeros DO  Indications / Diagnosis: lightheadedness  ECG reviewed by me, the ED Provider: yes   The EKG demonstrates:  Rhythm: normal sinus rhythm 81 BPM  Intervals: Normal OR and QT intervals  Axis: Normal axis  QRS/Blocks: Normal QRS  ST Changes: No acute ST/T waves changes. No TRISTAN. No TWI. SBIRT 20yo+    Flowsheet Row Most Recent Value   Initial Alcohol Screen: US AUDIT-C     1. How often do you have a drink containing alcohol? 0 Filed at: 08/16/2023 2231   Audit-C Score 0 Filed at: 08/16/2023 2231   PIPPA: How many times in the past year have you. ..     Used an illegal drug or used a prescription medication for non-medical reasons? Never Filed at: 08/16/2023 2231                    Medical Decision Making    45 y.o. female presenting for evaluation of episodic lightheadedness and bilateral ankle swelling. Vital stable. No calf swelling to suggest DVT. No erythema of the ankles to suggest infectious etiology. Symptoms possibly related to venous insufficiency. Patient denies chest pain, dyspnea or syncope. I do not suspect PE or ACS. Will check EKG to evaluate for arrhythmia. Will check labs to exclude anemia, electrolyte abnormality or UMM. Will exclude pregnancy. I have discussed with the patient our plan to discharge them from the ED and the patient is in agreement with this plan. The patient was provided a written after visit summary with strict RTED precautions. Discharge Plan: Supportive care with compression stockings. PCP follow-up. Followup: I have discussed with the patient plan to follow up with their PCP. Contact information provided in AVS.    Amount and/or Complexity of Data Reviewed  Labs: ordered. Disposition  Final diagnoses:   Bilateral swelling of feet and ankles   Lightheadedness     Time reflects when diagnosis was documented in both MDM as applicable and the Disposition within this note     Time User Action Codes Description Comment    8/16/2023 11:27 PM Marni Snellen Add [M25.471,  M25.474,  M25.475,  M25.472] Bilateral swelling of feet and ankles     8/16/2023 11:28 PM Marni Snellen Add [R42] [facility]       ED Disposition     ED Disposition   Discharge    Condition   Stable    Date/Time   Thu Aug 17, 2023 12:09 AM    Comment   Stephenie Garcia discharge to home/self care. Follow-up Information     Follow up With Specialties Details Why [facility], DO Family Medicine Schedule an appointment as soon as possible for a visit  To make appointment for reevaluation in 3-5 days.  [address] [place] [address]  [phone]            Discharge Medication List as of 8/17/2023 12:18 AM      CONTINUE these medications which have NOT CHANGED    Details   acetaminophen (TYLENOL) 650 mg CR tablet Take 1 tablet (650 mg total) by mouth every 8 (eight) hours as needed for moderate pain, Starting Sat 2/4/2023, Normal      dicyclomine (BENTYL) 20 mg tablet take 1 tablet by mouth every 6 hours for abdominal pain, Historical Med      ibuprofen (MOTRIN) 600 mg tablet Take 1 tablet (600 mg total) by mouth every 6 (six) hours as needed for moderate pain or mild pain, Starting Sat 2/4/2023, Normal             No discharge procedures on file.     PDMP Review     None          ED Provider  Electronically Signed by           Zeke De La Rosa DO  08/17/23 0111

## 2023-08-17 NOTE — DISCHARGE INSTRUCTIONS
You have been seen for evaluation of ankle swelling and lightheadedness. Please trial compression stockings for your symptoms. Return to the emergency department if you develop worsening lightheadedness, leg swelling or any other symptoms of concern. Please follow up with your PCP by calling the number provided.

## 2023-09-08 ENCOUNTER — HOSPITAL ENCOUNTER (EMERGENCY)
Facility: HOSPITAL | Age: 38
Discharge: HOME | End: 2023-09-08
Attending: EMERGENCY MEDICINE
Payer: COMMERCIAL

## 2023-09-08 ENCOUNTER — APPOINTMENT (EMERGENCY)
Dept: RADIOLOGY | Facility: HOSPITAL | Age: 38
End: 2023-09-08
Payer: COMMERCIAL

## 2023-09-08 VITALS
HEART RATE: 73 BPM | RESPIRATION RATE: 16 BRPM | OXYGEN SATURATION: 97 % | SYSTOLIC BLOOD PRESSURE: 145 MMHG | DIASTOLIC BLOOD PRESSURE: 78 MMHG | TEMPERATURE: 97 F

## 2023-09-08 DIAGNOSIS — M25.473 ANKLE SWELLING: Primary | ICD-10-CM

## 2023-09-08 LAB
EXT PREGNANCY TEST URINE: NEGATIVE
EXT. CONTROL: NORMAL

## 2023-09-08 PROCEDURE — 96372 THER/PROPH/DIAG INJ SC/IM: CPT

## 2023-09-08 PROCEDURE — 81025 URINE PREGNANCY TEST: CPT | Performed by: EMERGENCY MEDICINE

## 2023-09-08 PROCEDURE — 73610 X-RAY EXAM OF ANKLE: CPT

## 2023-09-08 PROCEDURE — 99283 EMERGENCY DEPT VISIT LOW MDM: CPT

## 2023-09-08 PROCEDURE — 99284 EMERGENCY DEPT VISIT MOD MDM: CPT | Performed by: EMERGENCY MEDICINE

## 2023-09-08 RX ORDER — NAPROXEN 500 MG/1
500 TABLET ORAL 2 TIMES DAILY WITH MEALS
Qty: 30 TABLET | Refills: 0 | Status: SHIPPED | OUTPATIENT
Start: 2023-09-08

## 2023-09-08 RX ORDER — KETOROLAC TROMETHAMINE 30 MG/ML
15 INJECTION, SOLUTION INTRAMUSCULAR; INTRAVENOUS ONCE
Status: COMPLETED | OUTPATIENT
Start: 2023-09-08 | End: 2023-09-08

## 2023-09-08 RX ORDER — ACETAMINOPHEN 325 MG/1
975 TABLET ORAL ONCE
Status: COMPLETED | OUTPATIENT
Start: 2023-09-08 | End: 2023-09-08

## 2023-09-08 RX ADMIN — KETOROLAC TROMETHAMINE 15 MG: 30 INJECTION, SOLUTION INTRAMUSCULAR; INTRAVENOUS at 23:08

## 2023-09-08 RX ADMIN — ACETAMINOPHEN 975 MG: 325 TABLET, FILM COATED ORAL at 23:07

## 2023-09-09 NOTE — DISCHARGE INSTRUCTIONS
Please take naproxen every 12 hours as needed for pain. You may also take tylenol every 6 hours as needed for pain. Please apply ice and elevate the leg. Please follow up with your primary care doctor.

## 2023-09-09 NOTE — ED PROVIDER NOTES
History  Chief Complaint   Patient presents with   • Ankle Swelling     Patient c/o right ankle pain and swelling. Patient denies injury. HPI     66-year-old female with no significant past medical history presents for evaluation of right ankle pain. Patient states she started to have pain in her ankle earlier today. She states she has been at work all day and has been on her feet. Standing makes the pain worse. She has not taken anything for pain. Denies any injuries to the ankle. She also states she has had swelling in the outside of her right ankle. Denies fevers or chills. Denies numbness or tingling or weakness in the leg. Patient is still been able to ambulate. Prior to Admission Medications   Prescriptions Last Dose Informant Patient Reported? Taking?   acetaminophen (TYLENOL) 650 mg CR tablet   No No   Sig: Take 1 tablet (650 mg total) by mouth every 8 (eight) hours as needed for moderate pain   dicyclomine (BENTYL) 20 mg tablet   Yes No   Sig: take 1 tablet by mouth every 6 hours for abdominal pain   ibuprofen (MOTRIN) 600 mg tablet   No No   Sig: Take 1 tablet (600 mg total) by mouth every 6 (six) hours as needed for moderate pain or mild pain      Facility-Administered Medications: None       Past Medical History:   Diagnosis Date   • IBS (irritable bowel syndrome)        History reviewed. No pertinent surgical history. History reviewed. No pertinent family history. I have reviewed and agree with the history as documented. E-Cigarette/Vaping   • E-Cigarette Use Never User      E-Cigarette/Vaping Substances     Social History     Tobacco Use   • Smoking status: Never   • Smokeless tobacco: Never   Vaping Use   • Vaping Use: Never used   Substance Use Topics   • Alcohol use: Never   • Drug use: Not Currently       Review of Systems   Constitutional: Negative for chills and fever. Respiratory: Negative for shortness of breath. Cardiovascular: Negative for chest pain. Gastrointestinal: Negative for abdominal pain, nausea and vomiting. Musculoskeletal: Positive for arthralgias. Negative for myalgias. Skin: Negative for rash and wound. Neurological: Negative for weakness and numbness. All other systems reviewed and are negative. Physical Exam  Physical Exam  Vitals and nursing note reviewed. Constitutional:       General: She is not in acute distress. Appearance: Normal appearance. She is well-developed and normal weight. She is not ill-appearing, toxic-appearing or diaphoretic. HENT:      Head: Normocephalic and atraumatic. Right Ear: External ear normal.      Left Ear: External ear normal.      Nose: Nose normal.      Mouth/Throat:      Mouth: Mucous membranes are moist.      Pharynx: Oropharynx is clear. Eyes:      Extraocular Movements: Extraocular movements intact. Conjunctiva/sclera: Conjunctivae normal.   Cardiovascular:      Rate and Rhythm: Normal rate and regular rhythm. Pulses: Normal pulses. Pulmonary:      Effort: Pulmonary effort is normal. No respiratory distress. Abdominal:      General: There is no distension. Musculoskeletal:         General: Tenderness present. Normal range of motion. Cervical back: Neck supple. Right lower leg: Edema present. Comments: Normal range of motion of the right ankle, no micromotion tenderness. Tenderness to the lateral part of the right ankle and mild swelling. No redness or warmth of the joint. Skin:     General: Skin is warm and dry. Coloration: Skin is not pale. Findings: No erythema or rash. Neurological:      General: No focal deficit present. Mental Status: She is alert and oriented to person, place, and time. Cranial Nerves: No cranial nerve deficit. Sensory: No sensory deficit. Motor: No weakness.    Psychiatric:         Mood and Affect: Mood normal.         Behavior: Behavior normal.         Vital Signs  ED Triage Vitals [09/08/23 2246]   Temperature Pulse Respirations Blood Pressure SpO2   (!) 97 °F (36.1 °C) 87 18 168/96 98 %      Temp Source Heart Rate Source Patient Position - Orthostatic VS BP Location FiO2 (%)   Temporal Monitor Sitting Left arm --      Pain Score       4           Vitals:    09/08/23 2246 09/08/23 2330   BP: 168/96 145/78   Pulse: 87 73   Patient Position - Orthostatic VS: Sitting Lying         Visual Acuity      ED Medications  Medications   ketorolac (TORADOL) injection 15 mg (15 mg Intramuscular Given 9/8/23 2308)   acetaminophen (TYLENOL) tablet 975 mg (975 mg Oral Given 9/8/23 2307)       Diagnostic Studies  Results Reviewed     Procedure Component Value Units Date/Time    POCT pregnancy, urine [[id number]]  (Normal) Resulted: 09/08/23 2307    Lab Status: Final result Updated: 09/08/23 2307     EXT Preg Test, Ur Negative     Control Valid                 XR ankle 3+ views RIGHT    (Results Pending)              Procedures  Procedures         ED Course                                             [facility]     24-year-old female with no significant past medical history presents for evaluation of right ankle pain, no trauma, she does have mild swelling and tenderness to the lateral part of the right ankle, no redness, warmth or micro motion tenderness so do not suspect septic joint. Possible arthralgias versus arthritis. Will obtain x-ray to evaluate for fracture. Will treat symptomatically with Toradol and Tylenol. Reviewed and interpreted x-rays, no acute fracture. Discussed with patient, will treat symptomatically with naproxen and Tylenol. Will give patient an Ace wrap for comfort and stability. We will have patient follow-up with her primary care doctor. Discussed with patient strict return precautions. Patient expressed understanding and was agreeable for discharge. Disposition  Final diagnoses:    Ankle swelling     Time reflects when diagnosis was documented in both MDM as applicable and the Disposition within this note     Time User Action Codes Description Comment    9/8/2023 11:29 PM Donald Valdez [F89.037] Ankle swelling       ED Disposition     ED Disposition   Discharge    Condition   Stable    Date/Time   Fri Sep 8, 2023 11:29 PM    Comment   Stephenie Jose discharge to home/self care. Follow-up Information     Follow up With Specialties Details Why Contact Info    Robi Oconnor DO Family Medicine   [address]  [phone]            Discharge Medication List as of 9/8/2023 11:30 PM      CONTINUE these medications which have NOT CHANGED    Details   acetaminophen (TYLENOL) 650 mg CR tablet Take 1 tablet (650 mg total) by mouth every 8 (eight) hours as needed for moderate pain, Starting Sat 2/4/2023, Normal      dicyclomine (BENTYL) 20 mg tablet take 1 tablet by mouth every 6 hours for abdominal pain, Historical Med      ibuprofen (MOTRIN) 600 mg tablet Take 1 tablet (600 mg total) by mouth every 6 (six) hours as needed for moderate pain or mild pain, Starting Sat 2/4/2023, Normal             No discharge procedures on file.     PDMP Review     None          ED Provider  Electronically Signed by           Alta Renee MD  09/09/23 7552